# Patient Record
(demographics unavailable — no encounter records)

---

## 2024-10-21 NOTE — DISCUSSION/SUMMARY
[FreeTextEntry1] : SOB r/o sec  Afib with loss atrial kick no decline of PFT data with minimal obstructive pattern and no gas exchange impairment stable pulmonary physiology Chronic shortness of breath Patient does have evidence of anatomical emphysema although pulmonary physiology is relatively unremarkable Multifactorial rule out deconditioning age loss of atrial kick sent.  A-fib with emphysema changes Recommendation trial of Trelegy Ellipta Patient was placed on 200 mcg dose 1 puff daily with a 2-week supply Requested to notify office to see if there is any clinical improvement and would then continue controller therapy   Acute asthmatic bronchitis Resolved  CT chest low-dose cancer screening protocol January 9, 2023 dating back to scans of June 2022 and May 11, 2021 Right upper lobe 8 mm stable irregular shaped pulmonary nodule Left apex stable 6 mm pulmonary nodule Prior seen 5 mm subpleural nodule right upper lobe no longer visualized Stable appearance of mild emphysema Patchy areas of mild chronic scar Left-sided Bochdalek hernia Impression Mild emphysema stable pulmonary nodules Lung RADS category 2 Benign appearance or behavior Follow-up 1 year  History of COVID-19 infection History of asthma History of cardiac arrhythmia on long-term amiodarone Pulmonary nodules and this patient dominant size 8 mm Lung RADS 3 from June 2022 scan Recommendations Continue Trellegy 1 puff QD Arrange follow-up chest CT scan f/u LD Jan 2024 Low-dose protocol short-term interval follow-up Office follow-up 2 weeks PFT follow-up Addendum follow-up with Dr. Estrada regarding blood pressure management For short-term we will increase labetalol to she is seen 2 tablets twice daily  HD FLU IM 10/21/24

## 2024-10-21 NOTE — HISTORY OF PRESENT ILLNESS
[Former] : former [TextBox_4] : 10/21/24 77 yo female did loose her  a few months ago and is feeling down  disc problems are leading to trouble ambulating, seeing new orthopedic MD arthritis is now in hands HD flu dose  74-year-old female c/o 2 weeks  dry  hacking cough poor sleep  sputum neg 2 days prior low  grade and last 48  hrs  neg OTC cough med and throat lozengier  salt water  gargle no active use of TRELEGY   Chronic shortness of breath Atrial fibrillation  CT CHEST  Can delay CT chest low-dose protocol Completed a CT chest angiogram protocol with cardiology and primary care December December 13, 2023 Overall impression scattered atherosclerotic calcification left circumflex artery No resulting stenosis Pulmonary findings Mild to moderate emphysema anatomical changes Peripheral areas of scar Stable less than 1 cm granuloma right upper lobe No additional focus of pulmonary parenchymal bronchial abnormalities either lung Left-sided diaphragmatic defect with fat extending in the left lower hemithorax  No pulmonary nodules Put in a reminder for a CT chest 1 year 2024 December or January 2025 CT chest low-dose cancer screening protocol January 9, 2023 dating back to scans of June 2022 and May 11, 2021 Right upper lobe 8 mm stable irregular shaped pulmonary nodule Left apex stable 6 mm pulmonary nodule Prior seen 5 mm subpleural nodule right upper lobe no longer visualized Stable appearance of mild emphysema Patchy areas of mild chronic scar Left-sided Bochdalek hernia Impression Mild emphysema stable pulmonary nodules Lung RADS category 2 Benign appearance or behavior Follow-up 1 year ongoing just shy 2 weeks asthmatic  bronchitis Resolved txed antibx steroids  pos severe nasal congestion and completed as recommended ENt post sinus drainage and change antix DOXY x 10 days although causing GI side  effect History of chronic asthma Long-term amiodarone therapy History of COVID-19 infection [facility] pulmonary nodules Former smoker Noted as of December 6, 2022 telephone visit completed for acute sinusitis symptomatology bronchitis symptoms with mucus As noted above post COVID infection proximately 2 months ago Low-grade fever  At that time was not initially wheezing but now has developed a wheeze since that visit Was treated with a Medrol Dosepak and Augmentin Tylenol Home O2 saturation reported stable

## 2024-10-21 NOTE — PHYSICAL EXAM
[No Acute Distress] : no acute distress [Normal Oropharynx] : normal oropharynx [I] : Mallampati Class: I [Normal Appearance] : normal appearance [Supple] : supple [No JVD] : no jvd [No Thyroid Nodules] : no thyroid nodules [Normal Rate/Rhythm] : normal rate/rhythm [Normal S1, S2] : normal s1, s2 [No Murmurs] : no murmurs [No Resp Distress] : no resp distress [No Abnormalities] : no abnormalities [Benign] : benign [Not Tender] : not tender [Soft] : soft [No HSM] : no hsm [Normal Bowel Sounds] : normal bowel sounds [Normal Gait] : normal gait [No Clubbing] : no clubbing [No Cyanosis] : no cyanosis [No Edema] : no edema [FROM] : FROM [Normal Color/ Pigmentation] : normal color/ pigmentation [No Focal Deficits] : no focal deficits [Oriented x3] : oriented x3 [Normal Affect] : normal affect [TextBox_68] : Bilateral scattered wheeze rhonchi with clearing at left and minimal rhonchi at R

## 2024-10-21 NOTE — PROCEDURE
[FreeTextEntry1] : PFT 10/21/24 Mild OAD  Lung volumes WNL DLCO 68 % mild reduction with loss fx  alveolar capillary units  NIOX 7 ppb WNL 10/21/24   PFT 6/24/24 [place] flow  rates WNL  Minimal OAD  Lung volumes WNL  No  air trapping  DLCO 75 %  HGB 11.6  Chest x-ray PA lateral March 28, 2024 Chief complaint chronic dry hacking cough Cardiac size grossly normal Scoliosis Atelectatic change right central lung zone No parenchymal infiltrate pleural effusion or dominant pulmonary nodules Serial chest x-rays without any  Pulmonary 6-minute walk exercise study February 21, 2024 Baseline O2 saturation 96% Buster desaturation 6 minutes 89% immediate recovery to 98% No evidence for need portable oxygen therapy PFT February 21, 2024 Well-preserved flow rates FEV1 80% predicted Mild obstructive ventilatory impairment Lung biopsy normal Diffusion 73% predicted normal range Overall stable pulmonary physiology Hemoglobin 11.7   Can delay CT chest low-dose protocol Completed a CT chest angiogram protocol with cardiology and primary care December December 13, 2023 Overall impression scattered atherosclerotic calcification left circumflex artery No resulting stenosis Pulmonary findings Mild to moderate emphysema anatomical changes Peripheral areas of scar Stable less than 1 cm granuloma right upper lobe No additional focus of pulmonary parenchymal bronchial abnormalities either lung Left-sided diaphragmatic defect with fat extending in the left lower hemithorax  No pulmonary nodules Put in a reminder for a CT chest 1 year 2024 December or January 2025  PFT October 25, 2023 Mild reduction in FVC Lung volumes are normal TLC 93% predicted Diffusion 70 Predicted Hemoglobin 11  Remains on amiodarone without evidence of pulmonary toxicity   15 July 10, 2023 Spirometry normal No bronchodilator administered Lung volumes normal % predicted No significant air trapping RV/TLC ratio 124% predicted Diffusion normal range 87% predicted Hemoglobin 11.5 Overall relatively stable pulmonary physiology  CT chest low-dose cancer screening protocol January 9, 2023 dating back to scans of June 2022 and May 11, 2021 Right upper lobe 8 mm stable irregular shaped pulmonary nodule Left apex stable 6 mm pulmonary nodule Prior seen 5 mm subpleural nodule right upper lobe no longer visualized Stable appearance of mild emphysema Patchy areas of mild chronic scar Left-sided Bochdalek hernia Impression Mild emphysema stable pulmonary nodules Lung RADS category 2 Benign appearance or behavior Follow-up 1 year  Chest x-ray PA lateral 12/14/2022 Borderline cardiomegaly S-shaped scoliosis No parenchymal infiltrate pleural effusions dominant pulmonary nodules Positive scar left lower lung zone There is no interval change compared to prior chest x-rays No evidence for consolidation consistent with pneumonia based on a chest radiograph  Chest x-ray PA lateral 12/8/2022 Cardiac size is grossly normal S-shaped scoliosis No acute infiltrates dominant pulmonary nodules pleural effusion or pneumothorax CT low-dose study of June 9, 2022 mild to moderate central centrilobular emphysema Irregular solid nodule right upper lobe 8 mm Left apex 6 mm pulmonary nodule Small fat-containing left-sided podalic hernia Scar left lower lobe  Recommendation was a 6-month low-dose CT chest follow-up   PFT 10/26/22 Well preserved flow rates  Mild OAD Lung Volumes nl  DLCO  91 % WNL  HGB10.3  Chest x-ray PA lateral October 7, 2022 Borderline cardiomegaly Vert vertical scar left lower lung zone No parenchymal infiltrates pleural effusions dominant pulmonary nodules Positive S-shaped scoliosis No interval change dating back to chest x-ray September 2, 2022 No clear COVID related pulmonary pathology  PFT 5/9/22 Flow rates nl lung  volumes nl Resistance and sp conductance nl DLCO 81 %  HGB 11.5  PFT January 27, 2022 Flow rates normal Very minimal obstructive ventilatory impairment FEV1 FVC ratio 77 Lung volumes normal Total lung capacity 99% of predicted Diffusion 80% predicted normal range RV/TLC ratio increased consistent with air trapping Impression air-trapping obstructive airway disease  CT chest May 12, 2021 Emphysema Stable pulmonary nodules dating back to August 2019 Post Ilac hernia identified No evidence for pulmonary fibrosis Dominant pulmonary nodule 5 mm   PFT 8/4/21 Normal flow rates  TLC 98 % pred with nl lung volumes  DLCO 79 % HGB 11.7  PFT 4/15/21 flow  rates nl Normal lung volumes low nl DLCO 70 % HGB 12.0  Pulmonary Stress Test 4/15/21 RA  No evidence  desaturation  CT ordered ENT soft tissue neck Incidental findings related to Mild emphysema changes 7 mm irregular nodule right upper lobe Prior CT from [facility] available August 17, 2019 reported a right upper lobe lesion at 1.0 cm in size  Chest x-ray PA lateral September 2, 2020 Borderline cardiomegaly S-shaped scoliosis Grossly clear lung fields No appreciable pulmonary nodule No pleural effusion  Vitamin B12 injection once micrograms intramuscular deltoid sterile technique for low B12 level states last received Dr Awad  CV 23 administer September 2, 2020  Spirometry 12/11/2019  Very mild OAD  No BD  stable pulmonary physiology  IM B12 10/2/124 High-dose flu vaccination IM October 21/ 2024

## 2024-10-21 NOTE — REVIEW OF SYSTEMS
[Fatigue] : fatigue [Chills] : chills [Nasal Congestion] : nasal congestion [Postnasal Drip] : postnasal drip [Cough] : cough [Chest Tightness] : chest tightness [Dyspnea] : dyspnea [Wheezing] : wheezing [SOB on Exertion] : sob on exertion [Chest Discomfort] : chest discomfort [Negative] : Neurologic [TextBox_44] : hx cardiac arrythmia on long term amiodarone

## 2025-01-08 NOTE — ADDENDUM
[FreeTextEntry1] : This note was written by Roselyn Vázquez on 01/08/2025 acting as medical scribe for Dr. Jacqueline Gupta. I, Dr. Jacqueline Gupta, have read and attest that all the information, medical decision making and discharge instructions within are true and accurate.

## 2025-01-08 NOTE — HEALTH RISK ASSESSMENT
[0] : 2) Feeling down, depressed, or hopeless: Not at all (0) [PHQ-2 Negative - No further assessment needed] : PHQ-2 Negative - No further assessment needed [Former] : Former [[id number]] : 0

## 2025-01-08 NOTE — HISTORY OF PRESENT ILLNESS
[FreeTextEntry1] : high bp [de-identified] : Ms. WEGENER is a 76 year old F with PMHx of HTN, Afib presenting for f/u. Pt reports bp is high in 160-180s in the morning, goes down to 140-150 during day. Is on labetalol 100mg AM, 200mg PM and losartan 50mg AM. Pt reports intermittent lightheadedness/dizziness and sometimes spinning on and off for years Denies chest pain, sob, gunn,, diaphoresis, palpitations, LE swelling, orthopnea, syncope, n/v.  Pt denies focal weakness, vision changes, numbness/tingling, dysphagia, dysarthria.

## 2025-01-08 NOTE — HEALTH RISK ASSESSMENT
[0] : 2) Feeling down, depressed, or hopeless: Not at all (0) [PHQ-2 Negative - No further assessment needed] : PHQ-2 Negative - No further assessment needed [Former] : Former [[id number]] : 0

## 2025-01-16 NOTE — HISTORY OF PRESENT ILLNESS
[FreeTextEntry1] : 76 year F here for assessment for osteoporosis and hypothyroidism    Patient with past medical Hx as below, remarkable for  atrial fibrillation, anxiety, GERD   Date last DEXA performed: 5/22/2023  T score -4.2 Site/Location where last DEXA was performed: [facility]  Lowest T score: -4.2 L spine       History of broken bone as an adult: No Premature menopause (<45 years of age):  No History of Primary hypogonadism: No Corticosteroid Therapy: No Tobacco use: No Alcohol use: No Maternal family history of hip fracture: No Low Body Mass Index (less than 19 kg/m2): No   Currently on Vitamin D3 oral supplementation: yes Currently on Calcium Oral supplementation: yes    History of prior/current prescribed anabolic/ antiresorptive therapy for osteoporosis:   Prolia for several years with decline in bone density x >3 years GERD with oral BPH Recommended forteo by another provider and here however says co pay is too high   s/p reclast infusion x 1 on 4/2024: reports flu like symptoms x 2-3 days, took acetaminophen and it went away      History of other common conditions associated with osteoporosis : Malabsorption: No known Hyperthyroidism: No known Chronic Renal Failure: No known Prolonged immobilization: No History of renal calculi/ elevated blood calcium: No known History of autoimmune disease: No known   Hypothyroidism   Related Thyroid History:   Prior or current medication thyroid use: levothyroxine 150mcg po daily  Known family or personal hx of thyroid disease: No History of hemithyroidectomy/ thyroidectomy: Yes, for nodules  Goiter or hx of goiter : No Known Hx of autoimmune disease: No History of Radioactive iodine therapy/ Chest or Neck radiation therapy: No

## 2025-01-16 NOTE — PHYSICAL EXAM
[Alert] : alert [Well Nourished] : well nourished [No Acute Distress] : no acute distress [Well Developed] : well developed [Normal Sclera/Conjunctiva] : normal sclera/conjunctiva [EOMI] : extra ocular movement intact [No Proptosis] : no proptosis [Normal Oropharynx] : the oropharynx was normal [No Neck Mass] : no neck mass was observed [Supple] : the neck was supple [No Respiratory Distress] : no respiratory distress [No Accessory Muscle Use] : no accessory muscle use [Clear to Auscultation] : lungs were clear to auscultation bilaterally [Normal S1, S2] : normal S1 and S2 [Normal Rate] : heart rate was normal [Regular Rhythm] : with a regular rhythm [No Edema] : no peripheral edema [Pedal Pulses Normal] : the pedal pulses are present [Normal Bowel Sounds] : normal bowel sounds [Not Tender] : non-tender [Not Distended] : not distended [Soft] : abdomen soft [Normal Anterior Cervical Nodes] : no anterior cervical lymphadenopathy [No Spinal Tenderness] : no spinal tenderness [Spine Straight] : spine straight [No Stigmata of Cushings Syndrome] : no stigmata of Cushings Syndrome [Normal Gait] : normal gait [Normal Strength/Tone] : muscle strength and tone were normal [No Rash] : no rash [Acanthosis Nigricans] : no acanthosis nigricans [Normal Reflexes] : deep tendon reflexes were 2+ and symmetric [No Tremors] : no tremors [Oriented x3] : oriented to person, place, and time

## 2025-02-17 NOTE — HEALTH RISK ASSESSMENT
[0] : 2) Feeling down, depressed, or hopeless: Not at all (0) [PHQ-2 Negative - No further assessment needed] : PHQ-2 Negative - No further assessment needed [Former] : Former [[id number]] : 0

## 2025-02-17 NOTE — HEALTH RISK ASSESSMENT
[0] : 2) Feeling down, depressed, or hopeless: Not at all (0) [PHQ-2 Negative - No further assessment needed] : PHQ-2 Negative - No further assessment needed [Former] : Former [[id number]] : 0

## 2025-02-17 NOTE — ADDENDUM
[FreeTextEntry1] : This note was written by Cassi Mayes on 02/13/2025 acting as medical scribe for Dr. Jacqueline Gupta. I, Dr. Jacqueline Gupta, have read and attest that all the information, medical decision making and discharge instructions within are true and accurate.

## 2025-02-17 NOTE — HISTORY OF PRESENT ILLNESS
[FreeTextEntry1] : 1-month f/u  bp check  [de-identified] : Ms. WEGENER is a 76-year-old F with PMHx of HTN, Afib presenting for bp check. Pt is taking Losartan 100mg daily and Labetalol HCI 100mg 1 tab in the AM and 2 tabs in the PM. Pt reports of high bp at home, says she gets 170s-180s/80-90s at rest. Pt says she gets terrible headaches at times. Denies chest pain, SOB, CHRISTINA, diaphoresis, palpitations, LE swelling, orthopnea, syncope, n/v, headache. Pt denies focal weakness, vision changes, numbness/tingling, dysphagia, dysarthria. Pt noncompliant with follow-up and previous recommendations.

## 2025-02-17 NOTE — HISTORY OF PRESENT ILLNESS
[FreeTextEntry1] : 1-month f/u  bp check  [de-identified] : Ms. WEGENER is a 76-year-old F with PMHx of HTN, Afib presenting for bp check. Pt is taking Losartan 100mg daily and Labetalol HCI 100mg 1 tab in the AM and 2 tabs in the PM. Pt reports of high bp at home, says she gets 170s-180s/80-90s at rest. Pt says she gets terrible headaches at times. Denies chest pain, SOB, CHRISTINA, diaphoresis, palpitations, LE swelling, orthopnea, syncope, n/v, headache. Pt denies focal weakness, vision changes, numbness/tingling, dysphagia, dysarthria. Pt noncompliant with follow-up and previous recommendations.

## 2025-02-20 NOTE — HEALTH RISK ASSESSMENT
[0] : 2) Feeling down, depressed, or hopeless: Not at all (0) [PHQ-2 Negative - No further assessment needed] : PHQ-2 Negative - No further assessment needed [Former] : Former [[id number]] : 0

## 2025-02-20 NOTE — HEALTH RISK ASSESSMENT
[0] : 2) Feeling down, depressed, or hopeless: Not at all (0) [PHQ-2 Negative - No further assessment needed] : PHQ-2 Negative - No further assessment needed [Former] : Former [[id number]] : 0

## 2025-02-20 NOTE — ADDENDUM
[FreeTextEntry1] : This note was written by Roselyn Vázquez on 02/19/2025 acting as medical scribe for Dr. Jacqueline Gupta. I, Dr. Jacqueline Gupta, have read and attest that all the information, medical decision making and discharge instructions within are true and accurate.

## 2025-02-20 NOTE — HISTORY OF PRESENT ILLNESS
[FreeTextEntry1] : 1 week bp f/u [de-identified] : Ms. WEGENER is a 76 year old F with PMHx of HTN, Afib presenting for 1 week bp f/u. On last visit, pt's bp was elevated so losartan 100 was changed to losartan-hctz 100-25.  At home check bp 130-160/70-80s. Denies chest pain, sob, gunn,  diaphoresis,LE swelling, orthopnea, syncope, n/v, headache.  Still reports dizziness worse with movement of her head and positional changes. Has headaches but says is chronic, did not make appt with neuro Dr Griggs yet as recommend.  Reports acute on chronic cough, refusing swab, denies f/c,  Reports occasional p palpitations, refused holter last visit. Says she scheduled CT head for this Friday and is refusing for me to have it expedited for today or earlier. Also denies falls, head trauam Denies LE weakness, urinary/bowel incontinence, saddle anesthesia.

## 2025-02-20 NOTE — HISTORY OF PRESENT ILLNESS
[FreeTextEntry1] : 1 week bp f/u [de-identified] : Ms. WEGENER is a 76 year old F with PMHx of HTN, Afib presenting for 1 week bp f/u. On last visit, pt's bp was elevated so losartan 100 was changed to losartan-hctz 100-25.  At home check bp 130-160/70-80s. Denies chest pain, sob, gunn,  diaphoresis,LE swelling, orthopnea, syncope, n/v, headache.  Still reports dizziness worse with movement of her head and positional changes. Has headaches but says is chronic, did not make appt with neuro Dr Griggs yet as recommend.  Reports acute on chronic cough, refusing swab, denies f/c,  Reports occasional p palpitations, refused holter last visit. Says she scheduled CT head for this Friday and is refusing for me to have it expedited for today or earlier. Also denies falls, head trauam Denies LE weakness, urinary/bowel incontinence, saddle anesthesia.

## 2025-03-19 NOTE — PROCEDURE
[FreeTextEntry1] : PFT March 19, 2025 Medication amiodarone Shortness of breath Well-preserved flow rates Very mild obstructive ventilatory impairment Lung labs are normal 89% predicted Mild borderline moderate reduction diffusion 61% predicted with a loss of functioning alveolar capillary units Hemoglobin 11.6 Overall there is a decline at the total lung capacity and mild decline in diffusion Do not believe this is consistent with amiodarone toxicity  Pulmonary 6-minute walk exercise study March 19, 2025 Indication decline in diffusion and total lung capacity on PFT Baseline O2 saturation 98% Normal study on room air No evidence of exercise-induced hypoxemia  Chest x-ray 1 year follow-up PA lateral March 19, 2025 Cardiac size normal Positive S-shaped scoliosis Increased vascular markings likely Shortness of breath secondary to implant No dominant pulmonary nodules pleural effusions or pneumothorax No interval change compared to a chest x-ray dating back to June 4, 2024   PFT 10/21/24 Mild OAD  Lung volumes WNL DLCO 68 % mild reduction with loss fx  alveolar capillary units  NIOX 7 ppb WNL 10/21/24   PFT 6/24/24 Waite flow  rates WNL  Minimal OAD  Lung volumes WNL  No  air trapping  DLCO 75 %  HGB 11.6  Chest x-ray PA lateral March 28, 2024 Chief complaint chronic dry hacking cough Cardiac size grossly normal Scoliosis Atelectatic change right central lung zone No parenchymal infiltrate pleural effusion or dominant pulmonary nodules Serial chest x-rays without any  Pulmonary 6-minute walk exercise study February 21, 2024 Baseline O2 saturation 96% Buster desaturation 6 minutes 89% immediate recovery to 98% No evidence for need portable oxygen therapy PFT February 21, 2024 Well-preserved flow rates FEV1 80% predicted Mild obstructive ventilatory impairment Lung biopsy normal Diffusion 73% predicted normal range Overall stable pulmonary physiology Hemoglobin 11.7   Can delay CT chest low-dose protocol Completed a CT chest angiogram protocol with cardiology and primary care December December 13, 2023 Overall impression scattered atherosclerotic calcification left circumflex artery No resulting stenosis Pulmonary findings Mild to moderate emphysema anatomical changes Peripheral areas of scar Stable less than 1 cm granuloma right upper lobe No additional focus of pulmonary parenchymal bronchial abnormalities either lung Left-sided diaphragmatic defect with fat extending in the left lower hemithorax  No pulmonary nodules Put in a reminder for a CT chest 1 year 2024 December or January 2025  PFT October 25, 2023 Mild reduction in FVC Lung volumes are normal TLC 93% predicted Diffusion 70 Predicted Hemoglobin 11  Remains on amiodarone without evidence of pulmonary toxicity   15 July 10, 2023 Spirometry normal No bronchodilator administered Lung volumes normal % predicted No significant air trapping RV/TLC ratio 124% predicted Diffusion normal range 87% predicted Hemoglobin 11.5 Overall relatively stable pulmonary physiology  CT chest low-dose cancer screening protocol January 9, 2023 dating back to scans of June 2022 and May 11, 2021 Right upper lobe 8 mm stable irregular shaped pulmonary nodule Left apex stable 6 mm pulmonary nodule Prior seen 5 mm subpleural nodule right upper lobe no longer visualized Stable appearance of mild emphysema Patchy areas of mild chronic scar Left-sided Bochdalek hernia Impression Mild emphysema stable pulmonary nodules Lung RADS category 2 Benign appearance or behavior Follow-up 1 year  Chest x-ray PA lateral 12/14/2022 Borderline cardiomegaly S-shaped scoliosis No parenchymal infiltrate pleural effusions dominant pulmonary nodules Positive scar left lower lung zone There is no interval change compared to prior chest x-rays No evidence for consolidation consistent with pneumonia based on a chest radiograph  Chest x-ray PA lateral 12/8/2022 Cardiac size is grossly normal S-shaped scoliosis No acute infiltrates dominant pulmonary nodules pleural effusion or pneumothorax CT low-dose study of June 9, 2022 mild to moderate central centrilobular emphysema Irregular solid nodule right upper lobe 8 mm Left apex 6 mm pulmonary nodule Small fat-containing left-sided podalic hernia Scar left lower lobe  Recommendation was a 6-month low-dose CT chest follow-up   PFT 10/26/22 Well preserved flow rates  Mild OAD Lung Volumes nl  DLCO  91 % WNL  HGB10.3  Chest x-ray PA lateral October 7, 2022 Borderline cardiomegaly Vert vertical scar left lower lung zone No parenchymal infiltrates pleural effusions dominant pulmonary nodules Positive S-shaped scoliosis No interval change dating back to chest x-ray September 2, 2022 No clear COVID related pulmonary pathology  PFT 5/9/22 Flow rates nl lung  volumes nl Resistance and sp conductance nl DLCO 81 %  HGB 11.5  PFT January 27, 2022 Flow rates normal Very minimal obstructive ventilatory impairment FEV1 FVC ratio 77 Lung volumes normal Total lung capacity 99% of predicted Diffusion 80% predicted normal range RV/TLC ratio increased consistent with air trapping Impression air-trapping obstructive airway disease  CT chest May 12, 2021 Emphysema Stable pulmonary nodules dating back to August 2019 Post Ilac hernia identified No evidence for pulmonary fibrosis Dominant pulmonary nodule 5 mm   PFT 8/4/21 Normal flow rates  TLC 98 % pred with nl lung volumes  DLCO 79 % HGB 11.7  PFT 4/15/21 flow  rates nl Normal lung volumes low nl DLCO 70 % HGB 12.0  Pulmonary Stress Test 4/15/21 RA  No evidence  desaturation  CT ordered ENT soft tissue neck Incidental findings related to Mild emphysema changes 7 mm irregular nodule right upper lobe Prior CT from [facility] available August 17, 2019 reported a right upper lobe lesion at 1.0 cm in size  Chest x-ray PA lateral September 2, 2020 Borderline cardiomegaly S-shaped scoliosis Grossly clear lung fields No appreciable pulmonary nodule No pleural effusion  Vitamin B12 injection once micrograms intramuscular deltoid sterile technique for low B12 level states last received Dr Awad  CV 23 administer September 2, 2020  Spirometry 12/11/2019  Very mild OAD  No BD  stable pulmonary physiology  IM B12 10/2/124 High-dose flu vaccination IM October 21/ 2024

## 2025-03-19 NOTE — DISCUSSION/SUMMARY
[FreeTextEntry1] : long term amiardone Mild decline in total lung capacity and diffusion with very mild obstructive ventilatory impairment mild to moderate gas exchange impairment No evidence of exercise-induced hypoxemia Follow-up 3 months  SOB r/o sec  Afib with loss atrial kick no decline of PFT data with minimal obstructive pattern and no gas exchange impairment stable pulmonary physiology Chronic shortness of breath Patient does have evidence of anatomical emphysema although pulmonary physiology is relatively unremarkable Multifactorial rule out deconditioning age loss of atrial kick sent.  A-fib with emphysema changes Recommendation trial of Trelegy Ellipta Patient was placed on 200 mcg dose 1 puff daily with a 2-week supply Requested to notify office to see if there is any clinical improvement and would then continue controller therapy   Acute asthmatic bronchitis Resolved  CT chest low-dose cancer screening protocol January 9, 2023 dating back to scans of June 2022 and May 11, 2021 Right upper lobe 8 mm stable irregular shaped pulmonary nodule Left apex stable 6 mm pulmonary nodule Prior seen 5 mm subpleural nodule right upper lobe no longer visualized Stable appearance of mild emphysema Patchy areas of mild chronic scar Left-sided Bochdalek hernia Impression Mild emphysema stable pulmonary nodules Lung RADS category 2 Benign appearance or behavior Follow-up 1 year  History of COVID-19 infection History of asthma History of cardiac arrhythmia on long-term amiodarone Pulmonary nodules and this patient dominant size 8 mm Lung RADS 3 from June 2022 scan Recommendations Continue Trellegy 1 puff QD Arrange follow-up chest CT scan f/u LD Jan 2024 Low-dose protocol short-term interval follow-up Office follow-up 2 weeks PFT follow-up Addendum follow-up with Dr. Estrada regarding blood pressure management For short-term we will increase labetalol to she is seen 2 tablets twice daily  HD FLU IM 10/21/24

## 2025-03-19 NOTE — HISTORY OF PRESENT ILLNESS
[Former] : former [TextBox_4] : 10/21/24 75 yo female did loose her  a few months ago and is feeling down  disc problems are leading to trouble ambulating, seeing new orthopedic MD arthritis is now in hands HD flu dose  74-year-old female c/o 2 weeks  dry  hacking cough poor sleep  sputum neg 2 days prior low  grade and last 48  hrs  neg OTC cough med and throat lozengier  salt water  gargle no active use of TRELEGY   Chronic shortness of breath Atrial fibrillation  CT CHEST  Can delay CT chest low-dose protocol Completed a CT chest angiogram protocol with cardiology and primary care December December 13, 2023 Overall impression scattered atherosclerotic calcification left circumflex artery No resulting stenosis Pulmonary findings Mild to moderate emphysema anatomical changes Peripheral areas of scar Stable less than 1 cm granuloma right upper lobe No additional focus of pulmonary parenchymal bronchial abnormalities either lung Left-sided diaphragmatic defect with fat extending in the left lower hemithorax  No pulmonary nodules Put in a reminder for a CT chest 1 year 2024 December or January 2025 CT chest low-dose cancer screening protocol January 9, 2023 dating back to scans of June 2022 and May 11, 2021 Right upper lobe 8 mm stable irregular shaped pulmonary nodule Left apex stable 6 mm pulmonary nodule Prior seen 5 mm subpleural nodule right upper lobe no longer visualized Stable appearance of mild emphysema Patchy areas of mild chronic scar Left-sided Bochdalek hernia Impression Mild emphysema stable pulmonary nodules Lung RADS category 2 Benign appearance or behavior Follow-up 1 year ongoing just shy 2 weeks asthmatic  bronchitis Resolved txed antibx steroids  pos severe nasal congestion and completed as recommended ENt post sinus drainage and change antix DOXY x 10 days although causing GI side  effect History of chronic asthma Long-term amiodarone therapy History of COVID-19 infection [facility] pulmonary nodules Former smoker Noted as of December 6, 2022 telephone visit completed for acute sinusitis symptomatology bronchitis symptoms with mucus As noted above post COVID infection proximately 2 months ago Low-grade fever  At that time was not initially wheezing but now has developed a wheeze since that visit Was treated with a Medrol Dosepak and Augmentin Tylenol Home O2 saturation reported stable

## 2025-04-14 NOTE — DISCUSSION/SUMMARY
[FreeTextEntry1] :  Dr Hair Timmons was the supervising provider for this infusion. PRINCIPAL DISCHARGE DIAGNOSIS  Diagnosis: Acute on chronic respiratory failure with hypercapnia  Assessment and Plan of Treatment: You presented to the ED with complaints of rectal bleed and found to have profound elevation of carbon dioxide in the 90s which prompted the ICU team to be consulted. You were placed on BiPAP with improvement in your CO2 however you remained with CO2 elevated due to non-complience to the use of BiPAP in the hospital. [facility] was consulted again and pulmonology as well. Your CO2 retention improves when on BiPAP at night. You should continue use as prescribed to prevent further deterioration. Continue with breathing treatments at home and follow up with your pulmonologist within 1 week from discharge. Dr. Prado (pulmonologist).      SECONDARY DISCHARGE DIAGNOSES  Diagnosis: Rectal bleeding  Assessment and Plan of Treatment: You were admitted for complaints of rectal bleed. One episode was witnessed by RN in the hospital. Gastroenterology was consulted however no further episodes. Your hemoglobin remiand stable and therefore no further plans from gastroenterology standpoint. You are to follow up with gastroenterology outpatient. Continue Protonix 2 x per day. MOnitor for signs of gastrointstinal  bleeding:   Abdominal cramping.  Dark-colored poop or regular-colored poop with blood in it.  Pale appearance.  Shortness of breath (dyspnea).  Tiredness  Vomit with blood in it or a substance that looks like coffee grounds.  Weakness and fatigue.    Diagnosis: Hypokalemia  Assessment and Plan of Treatment: After receiving diuretic your potassium level dropped. Potassium was replaced and is now within normal levels.    Diagnosis: Constipation  Assessment and Plan of Treatment: It was noticed on the chest imaging that you had a large amount of stool collection. A bowel regimen was in place. Enema was given. You should continue taking stool softners at home and have a good intake of fluids to prevent constipation. You should have at least 1 bowel movement per day.    Diagnosis: HTN (hypertension)  Assessment and Plan of Treatment: Continue taking your home medications as prescribed and monitor your blood pressure at home. Follow up with your primary care provider to go over your medications list after discharge within 1 week from discharge date.     PRINCIPAL DISCHARGE DIAGNOSIS  Diagnosis: Acute on chronic respiratory failure with hypercapnia  Assessment and Plan of Treatment: You presented to the ED with complaints of rectal bleed and found to have profound elevation of carbon dioxide in the 90s which prompted the ICU team to be consulted. You were placed on BiPAP with improvement in your CO2 however you remained with CO2 elevated due to non-complience to the use of BiPAP in the hospital. [facility] was consulted again and pulmonology as well. Your CO2 retention improves when on BiPAP at night. You should continue use as prescribed to prevent further deterioration. Continue with breathing treatments at home and follow up with your pulmonologist within 1 week from discharge. Dr. Prado (pulmonologist).  BIPAP SETTINGS   FIO2 40%  13/5  BACK UP RATE: 12  N/C 2 LITERS WHEN OFF THE BIPAP      SECONDARY DISCHARGE DIAGNOSES  Diagnosis: Rectal bleeding  Assessment and Plan of Treatment: You were admitted for complaints of rectal bleed. One episode was witnessed by RN in the hospital. Gastroenterology was consulted however no further episodes. Your hemoglobin remiand stable and therefore no further plans from gastroenterology standpoint. You are to follow up with gastroenterology outpatient. Continue Protonix 2 x per day. MOnitor for signs of gastrointstinal  bleeding:   Abdominal cramping.  Dark-colored poop or regular-colored poop with blood in it.  Pale appearance.  Shortness of breath (dyspnea).  Tiredness  Vomit with blood in it or a substance that looks like coffee grounds.  Weakness and fatigue.    Diagnosis: Hypokalemia  Assessment and Plan of Treatment: After receiving diuretic your potassium level dropped. Potassium was replaced and is now within normal levels.    Diagnosis: Constipation  Assessment and Plan of Treatment: It was noticed on the chest imaging that you had a large amount of stool collection. A bowel regimen was in place. Enema was given. You should continue taking stool softners at home and have a good intake of fluids to prevent constipation. You should have at least 1 bowel movement per day.    Diagnosis: HTN (hypertension)  Assessment and Plan of Treatment: Continue taking your home medications as prescribed and monitor your blood pressure at home. Follow up with your primary care provider to go over your medications list after discharge within 1 week from discharge date.     PRINCIPAL DISCHARGE DIAGNOSIS  Diagnosis: Acute on chronic respiratory failure with hypercapnia  Assessment and Plan of Treatment: You presented to the ED with complaints of rectal bleed and found to have profound elevation of carbon dioxide in the 90s which prompted the ICU team to be consulted. You were placed on BiPAP with improvement in your CO2 however you remained with CO2 elevated due to non-complience to the use of BiPAP in the hospital. [facility] was consulted again and pulmonology as well. Your CO2 retention improves when on BiPAP at night. You should continue use as prescribed to prevent further deterioration. Continue with breathing treatments at home and follow up with your pulmonologist within 1 week from discharge. Dr. Prado (pulmonologist). Patient also to follow up neurologist at the rehab center or Dr. Valdes to rule out musckleskeltal disorders.   BIPAP SETTINGS   FIO2 40%  13/5  BACK UP RATE: 12  N/C 2 LITERS WHEN OFF THE BIPAP      SECONDARY DISCHARGE DIAGNOSES  Diagnosis: Rectal bleeding  Assessment and Plan of Treatment: You were admitted for complaints of rectal bleed. One episode was witnessed by RN in the hospital. Gastroenterology was consulted however no further episodes. Your hemoglobin remiand stable and therefore no further plans from gastroenterology standpoint. You are to follow up with gastroenterology outpatient. Continue Protonix 2 x per day. MOnitor for signs of gastrointstinal  bleeding:   Abdominal cramping.  Dark-colored poop or regular-colored poop with blood in it.  Pale appearance.  Shortness of breath (dyspnea).  Tiredness  Vomit with blood in it or a substance that looks like coffee grounds.  Weakness and fatigue.    Diagnosis: Hypokalemia  Assessment and Plan of Treatment: After receiving diuretic your potassium level dropped. Potassium was replaced and is now within normal levels.    Diagnosis: Constipation  Assessment and Plan of Treatment: It was noticed on the chest imaging that you had a large amount of stool collection. A bowel regimen was in place. Enema was given. You should continue taking stool softners at home and have a good intake of fluids to prevent constipation. You should have at least 1 bowel movement per day.    Diagnosis: HTN (hypertension)  Assessment and Plan of Treatment: Continue taking your home medications as prescribed and monitor your blood pressure at home. Follow up with your primary care provider to go over your medications list after discharge within 1 week from discharge date.

## 2025-05-13 NOTE — HEALTH RISK ASSESSMENT
[0] : 2) Feeling down, depressed, or hopeless: Not at all (0) [PHQ-2 Negative - No further assessment needed] : PHQ-2 Negative - No further assessment needed [Former] : Former [[id number]] : 0

## 2025-05-13 NOTE — HISTORY OF PRESENT ILLNESS
[FreeTextEntry8] : Ms. WEGENER is a 76 year old F with PMHx of HTN, Afib presenting today for c/o lightheadedness x11 days. or the past 10 days [place] felt lightheaded and like I would pass out. I am also very weak and when I exert myself, I am out of breath. I think it is my medication. I am on losartan and my friend said it made her feel like this. The other day my blood pressure was 90/50 when I felt like I was going to pass out. Then 2 hours later it was 166/88. This morning my blood pressure is good 130/82." Pt denies any chest pain, palpitations or lightheadedness or dizziness. Pt refused going to urgent care or ER. RN place pt on hold and dw Dr. Estrada. Per Dr. Estrada pt to hold medication and see Dr. Gupta tomorrow. RN relayed recommendation to pt. Pt stated she already took medication Saw neuro Dr Griggs recently and had EEG>

## 2025-05-22 NOTE — ADDENDUM
[FreeTextEntry1] : This note was written by Roselyn Vázquez on 05/22/2025 acting as medical scribe for Dr. Jacqueline Gupta. I, Dr. Jacqueline Gupta, have read and attest that all the information, medical decision making and discharge instructions within are true and accurate.

## 2025-05-22 NOTE — HISTORY OF PRESENT ILLNESS
[Home] : at home, [unfilled] , at the time of the visit. [Medical Office: ([facility])___] : at the medical office located in  [Telehealth (audio & video)] : This visit was provided via telehealth using real-time 2-way audio visual technology. [Verbal consent obtained from patient] : the patient, [unfilled] [FreeTextEntry1] : bp f/u [de-identified] : Ms. WEGENER is a 76 year old F with PMHx of  HTN, Afib presenting for bp f/u. On last visit, losartan-hctz was switched to amlo 5mg. Pt reports low bp of 100-110s/50-60s on amlo 5mg a few days ago and felt lightheaded at the time so she held it past fwe days. BP uptrended s today she took half her dose amlo 2.5mg daily and bp today improved to 130/78, no dizziness.  Patient feels well. No complaints. Denies chest pain, sob, gunn, dizziness, orthopnea, diaphoresis, palpitations, LE swelling, syncope, n/v, headache.

## 2025-05-22 NOTE — HEALTH RISK ASSESSMENT
[0] : 2) Feeling down, depressed, or hopeless: Not at all (0) [PHQ-2 Negative - No further assessment needed] : PHQ-2 Negative - No further assessment needed [Former] : Former [[id number]] : 0

## 2025-05-22 NOTE — HEALTH RISK ASSESSMENT
[0] : 2) Feeling down, depressed, or hopeless: Not at all (0) [PHQ-2 Negative - No further assessment needed] : PHQ-2 Negative - No further assessment needed [Former] : Former [[id number]] : 0

## 2025-05-22 NOTE — HISTORY OF PRESENT ILLNESS
[Home] : at home, [unfilled] , at the time of the visit. [Medical Office: ([facility])___] : at the medical office located in  [Telehealth (audio & video)] : This visit was provided via telehealth using real-time 2-way audio visual technology. [Verbal consent obtained from patient] : the patient, [unfilled] [FreeTextEntry1] : bp f/u [de-identified] : Ms. WEGENER is a 76 year old F with PMHx of  HTN, Afib presenting for bp f/u. On last visit, losartan-hctz was switched to amlo 5mg. Pt reports low bp of 100-110s/50-60s on amlo 5mg a few days ago and felt lightheaded at the time so she held it past fwe days. BP uptrended s today she took half her dose amlo 2.5mg daily and bp today improved to 130/78, no dizziness.  Patient feels well. No complaints. Denies chest pain, sob, gunn, dizziness, orthopnea, diaphoresis, palpitations, LE swelling, syncope, n/v, headache.

## 2025-06-19 NOTE — HISTORY OF PRESENT ILLNESS
[FreeTextEntry1] : 76 year F here for assessment for osteoporosis and hypothyroidism    Patient with past medical Hx as below, remarkable for  atrial fibrillation, anxiety, GERD   Osteoporosis:  Repeat DEXA done 6-3-2025   Date prior  DEXA performed: 5/22/2023  Site/Location where last DEXA was performed: [facility]  Lowest T score: -4.2 L spine       History of broken bone as an adult: No Premature menopause (<45 years of age):  No History of Primary hypogonadism: No Corticosteroid Therapy: No Tobacco use: No Alcohol use: No Maternal family history of hip fracture: No Low Body Mass Index (less than 19 kg/m2): No   Currently on Vitamin D3 oral supplementation: yes Currently on Calcium Oral supplementation: yes    History of prior/current prescribed anabolic/ antiresorptive therapy for osteoporosis:   Prolia for several years with decline in bone density x >3 years GERD with oral BPH Recommended forteo by another provider and here however says co pay is too high   s/p reclast infusion x 1 on 4/2024: reports flu like symptoms x 2-3 days, took acetaminophen and it went away  s/p reclast infusion x 2 on 4/2024: no side effects reported      History of other common conditions associated with osteoporosis : Malabsorption: No known Hyperthyroidism: No known Chronic Renal Failure: No known Prolonged immobilization: No History of renal calculi/ elevated blood calcium: No known History of autoimmune disease: No known   Hypothyroidism   Related Thyroid History:   Prior or current medication thyroid use: levothyroxine 150mcg po daily  Known family or personal hx of thyroid disease: No History of hemithyroidectomy/ thyroidectomy: Yes, for nodules  Goiter or hx of goiter : No Known Hx of autoimmune disease: No History of Radioactive iodine therapy/ Chest or Neck radiation therapy: No

## 2025-07-01 NOTE — HISTORY OF PRESENT ILLNESS
[FreeTextEntry1] : This is a 76 y/o female with a pmhx of HTN, Afib who presents to the office for a follow up.  CTCA 5/2025 showing CAC 1, no significant coronary artery stenosis.  Patient had Echo 5/2025 showing normal EF, severe LAE, mild PARISH, borderline pulm HTN, advised for cardiac amyloid scan.  s/p cardiac SPECT/CT not suggestive of transthyretin cardiac amyloidosis. Patient continue to reports dizziness, near syncope. She also reports significant dyspnea on exertion. Pt reports weakness in arms and legs. She has poor balance. She reports tremors. She reports numbness in fingers and toes.